# Patient Record
Sex: FEMALE | Race: WHITE | Employment: OTHER | URBAN - METROPOLITAN AREA
[De-identification: names, ages, dates, MRNs, and addresses within clinical notes are randomized per-mention and may not be internally consistent; named-entity substitution may affect disease eponyms.]

---

## 2020-11-23 NOTE — PROGRESS NOTES
CHIEF COMPLAINT:   No chief complaint on file. HPI:   Danuta Valencia is a 54year old female who presents for upper respiratory symptoms for  1 days. Patient reports congestion. Symptoms have been unchanged since onset.   Treating symptoms with iron Educated on s/s of worsening  Meds & Refills for this Visit:  Requested Prescriptions      No prescriptions requested or ordered in this encounter     Risks, benefits, and side effects of medication explained and discussed.     The patient indicates underst Infection is caused by germs. An infected person carries germs that he or she can give to others. Even a person who doesn’t feel sick can still carry and spread germs. Germs can cause infection by traveling through the air.  They can also cause infection by Candy last reviewed this educational content on 6/1/2019  © 0555-4709 [employer]. [address] [facility], [facility] [place]. All rights reserved. This information is not intended as a substitute for professional medical care.  Always follo

## 2020-11-23 NOTE — PATIENT INSTRUCTIONS
[employer] Video Sheets  Symptoms of COVID-19 Infection  You're not feeling well. You're worried you may be infected with the COVID-19 virus. But what are the signs? Here's what to look for.   To watch the video:  Scan the QR code  Using your mobile devic · Place an infected person in a private room. Or in a room with others who have exactly the same infection. (This depends on what kind of infection the person has.)  · Place restrictions on who can enter and exit this room.   · Wear a mask and eye protectio

## 2024-07-19 PROBLEM — N39.0 URINARY TRACT INFECTION WITHOUT HEMATURIA: Status: ACTIVE | Noted: 2018-08-06

## 2024-07-22 ENCOUNTER — OFFICE VISIT (OUTPATIENT)
Dept: FAMILY MEDICINE CLINIC | Facility: CLINIC | Age: 49
End: 2024-07-22
Payer: COMMERCIAL

## 2024-07-22 VITALS
HEIGHT: 60.45 IN | DIASTOLIC BLOOD PRESSURE: 70 MMHG | RESPIRATION RATE: 20 BRPM | HEART RATE: 82 BPM | TEMPERATURE: 97 F | SYSTOLIC BLOOD PRESSURE: 122 MMHG | BODY MASS INDEX: 27.2 KG/M2 | OXYGEN SATURATION: 98 % | WEIGHT: 142.19 LBS

## 2024-07-22 DIAGNOSIS — Z23 NEED FOR VACCINATION: ICD-10-CM

## 2024-07-22 DIAGNOSIS — Z13.0 SCREENING FOR ENDOCRINE, NUTRITIONAL, METABOLIC AND IMMUNITY DISORDER: ICD-10-CM

## 2024-07-22 DIAGNOSIS — Z00.00 ANNUAL PHYSICAL EXAM: Primary | ICD-10-CM

## 2024-07-22 DIAGNOSIS — F41.9 ANXIETY: ICD-10-CM

## 2024-07-22 DIAGNOSIS — H54.7 DECREASED VISION: ICD-10-CM

## 2024-07-22 DIAGNOSIS — Z13.21 SCREENING FOR ENDOCRINE, NUTRITIONAL, METABOLIC AND IMMUNITY DISORDER: ICD-10-CM

## 2024-07-22 DIAGNOSIS — Z13.6 SCREENING FOR ISCHEMIC HEART DISEASE: ICD-10-CM

## 2024-07-22 DIAGNOSIS — Z12.11 ENCOUNTER FOR SCREENING COLONOSCOPY: ICD-10-CM

## 2024-07-22 DIAGNOSIS — Z13.29 SCREENING FOR ENDOCRINE, NUTRITIONAL, METABOLIC AND IMMUNITY DISORDER: ICD-10-CM

## 2024-07-22 DIAGNOSIS — Z12.4 SCREENING FOR CERVICAL CANCER: ICD-10-CM

## 2024-07-22 DIAGNOSIS — Z13.228 SCREENING FOR ENDOCRINE, NUTRITIONAL, METABOLIC AND IMMUNITY DISORDER: ICD-10-CM

## 2024-07-22 DIAGNOSIS — Z12.31 ENCOUNTER FOR SCREENING MAMMOGRAM FOR MALIGNANT NEOPLASM OF BREAST: ICD-10-CM

## 2024-07-22 LAB
BASOPHILS # BLD AUTO: 0.04 X10(3) UL (ref 0–0.2)
BASOPHILS NFR BLD AUTO: 0.5 %
EOSINOPHIL # BLD AUTO: 0.12 X10(3) UL (ref 0–0.7)
EOSINOPHIL NFR BLD AUTO: 1.5 %
ERYTHROCYTE [DISTWIDTH] IN BLOOD BY AUTOMATED COUNT: 12.6 %
HCT VFR BLD AUTO: 44.2 %
HGB BLD-MCNC: 14.7 G/DL
IMM GRANULOCYTES # BLD AUTO: 0.02 X10(3) UL (ref 0–1)
IMM GRANULOCYTES NFR BLD: 0.2 %
LYMPHOCYTES # BLD AUTO: 2.04 X10(3) UL (ref 1–4)
LYMPHOCYTES NFR BLD AUTO: 25.1 %
MCH RBC QN AUTO: 30.6 PG (ref 26–34)
MCHC RBC AUTO-ENTMCNC: 33.3 G/DL (ref 31–37)
MCV RBC AUTO: 91.9 FL
MONOCYTES # BLD AUTO: 0.53 X10(3) UL (ref 0.1–1)
MONOCYTES NFR BLD AUTO: 6.5 %
NEUTROPHILS # BLD AUTO: 5.39 X10 (3) UL (ref 1.5–7.7)
NEUTROPHILS # BLD AUTO: 5.39 X10(3) UL (ref 1.5–7.7)
NEUTROPHILS NFR BLD AUTO: 66.2 %
PLATELET # BLD AUTO: 348 10(3)UL (ref 150–450)
RBC # BLD AUTO: 4.81 X10(6)UL
WBC # BLD AUTO: 8.1 X10(3) UL (ref 4–11)

## 2024-07-22 PROCEDURE — 3008F BODY MASS INDEX DOCD: CPT | Performed by: FAMILY MEDICINE

## 2024-07-22 PROCEDURE — 90471 IMMUNIZATION ADMIN: CPT | Performed by: FAMILY MEDICINE

## 2024-07-22 PROCEDURE — 80061 LIPID PANEL: CPT | Performed by: FAMILY MEDICINE

## 2024-07-22 PROCEDURE — 80050 GENERAL HEALTH PANEL: CPT | Performed by: FAMILY MEDICINE

## 2024-07-22 PROCEDURE — 90750 HZV VACC RECOMBINANT IM: CPT | Performed by: FAMILY MEDICINE

## 2024-07-22 PROCEDURE — 90715 TDAP VACCINE 7 YRS/> IM: CPT | Performed by: FAMILY MEDICINE

## 2024-07-22 PROCEDURE — 3078F DIAST BP <80 MM HG: CPT | Performed by: FAMILY MEDICINE

## 2024-07-22 PROCEDURE — 3074F SYST BP LT 130 MM HG: CPT | Performed by: FAMILY MEDICINE

## 2024-07-22 PROCEDURE — 88175 CYTOPATH C/V AUTO FLUID REDO: CPT | Performed by: FAMILY MEDICINE

## 2024-07-22 PROCEDURE — 90472 IMMUNIZATION ADMIN EACH ADD: CPT | Performed by: FAMILY MEDICINE

## 2024-07-22 PROCEDURE — 99386 PREV VISIT NEW AGE 40-64: CPT | Performed by: FAMILY MEDICINE

## 2024-07-22 PROCEDURE — 87624 HPV HI-RISK TYP POOLED RSLT: CPT | Performed by: FAMILY MEDICINE

## 2024-07-22 NOTE — PATIENT INSTRUCTIONS
Labs today     Pap/HPV today     Mammogram order     Colonoscopy referral     Ophtho referral     Tdap and shingrix vaccines today     F/u 1 yr for next physical or sooner if needed

## 2024-07-22 NOTE — PROGRESS NOTES
Subjective:   Xiao Tee is a 49 year old female who presents for complete px.  She is a new pt to the clinic.  She is here w/ her .  Has not had any healthcare in over 10 years.  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  Has a lot of medical anxiety and fear about discovering things.  Used to read the [employer] encyclopedia in high school so knows a lot of medical knowledge.  Would come up with correct diagnoses when watching medical shows.   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      The patient reports:  No changes in nevi  No changes in vision-needs cheaters     History:   LMP: No LMP recorded (lmp unknown). (Menstrual status: Menopause).  Last pap date: 4-5 yrs ago  Abnormal pap? no  : 0  Para: 0    History/Other:    Chief Complaint Reviewed and Verified  Nursing Notes Reviewed and   Verified  Tobacco Reviewed  Allergies Reviewed  Medications Reviewed    OB Status Reviewed  Family History Reviewed  Social History Reviewed         Tobacco:  She has never smoked tobacco.    No current outpatient medications on file.       Review of Systems:  See HPI     Objective:   /70 (BP Location: Left arm, Patient Position: Sitting, Cuff Size: adult)   Pulse 82   Temp 97.2 °F (36.2 °C) (Temporal)   Resp 20   Ht 5' 0.45\" (1.535 m)   Wt 142 lb 3.2 oz (64.5 kg)   LMP  (LMP Unknown)   SpO2 98%   BMI 27.36 kg/m²  Estimated body mass index is 27.36 kg/m² as calculated from the following:    Height as of this encounter: 5' 0.45\" (1.535 m).    Weight as of this encounter: 142 lb 3.2 oz (64.5 kg).  Physical Exam  Constitutional:       Appearance: Normal appearance.   HENT:      Right Ear: Tympanic membrane and ear canal normal.      Left Ear: Tympanic membrane and ear canal normal.      Mouth/Throat:      Mouth: Mucous membranes are moist.      Pharynx: Oropharynx is clear.   Eyes:      Pupils: Pupils are equal, round, and reactive to light.   Cardiovascular:      Rate and Rhythm: Normal rate  and regular rhythm.      Pulses: Normal pulses.      Heart sounds: No murmur heard.     No friction rub. No gallop.   Pulmonary:      Effort: Pulmonary effort is normal. No respiratory distress.      Breath sounds: No wheezing, rhonchi or rales.   Abdominal:      General: Bowel sounds are normal. There is no distension.      Palpations: Abdomen is soft.      Tenderness: There is no abdominal tenderness.      Comments: overweight   Genitourinary:     Labia:         Right: No rash, tenderness or lesion.         Left: No rash, tenderness or lesion.       Comments: Vaginal atrophy present  Stenotic cervical os  Musculoskeletal:         General: No tenderness.      Cervical back: Neck supple.      Right lower leg: No edema.      Left lower leg: No edema.   Lymphadenopathy:      Cervical: No cervical adenopathy.   Skin:     General: Skin is warm.   Neurological:      General: No focal deficit present.      Mental Status: She is alert.   Psychiatric:         Mood and Affect: Mood is anxious.         Speech: Speech normal.         Behavior: Behavior is hyperactive.      Comments: Tearful at times         Assessment & Plan:   1. Annual physical exam    2. Screening for cervical cancer    3. Decreased vision    4. Anxiety    5. Encounter for screening mammogram for malignant neoplasm of breast    6. Screening for ischemic heart disease    7. Screening for endocrine, nutritional, metabolic and immunity disorder    8. Need for vaccination    9. Encounter for screening colonoscopy        Screening labs-cmp, lipids, TSH, CBC  Pap/HPV pending today   Mammogram order given-strongly recommend she go for this   Colonoscopy referral given-strongly recommend she go for this  Immunizations-shingrix and Tdap today  Gave referral for ophtho for decreased vision  Do think pt likely has uncontrolled anxiety.  Not interested in treatment at this time.  She will let us know if she changes her mind   Patient counseling: nutrition: stressed  importance of moderation in sodium/caffeine intake, saturated fat and cholesterol, caloric balance, sufficient intake of fresh fruits, vegetables, fiber, calcium, iron.  Exercise: stressed the importance of regular exercise  F/u 1 yr for next px or sooner if needed    Laurie Ritter DO, 07/24/24, 1:23 PM

## 2024-07-23 ENCOUNTER — HOSPITAL ENCOUNTER (OUTPATIENT)
Dept: MAMMOGRAPHY | Facility: HOSPITAL | Age: 49
Discharge: HOME | End: 2024-07-23
Attending: FAMILY MEDICINE
Payer: COMMERCIAL

## 2024-07-23 ENCOUNTER — TELEPHONE (OUTPATIENT)
Dept: FAMILY MEDICINE CLINIC | Facility: CLINIC | Age: 49
End: 2024-07-23

## 2024-07-23 DIAGNOSIS — R92.8 ABNORMAL MAMMOGRAM OF LEFT BREAST: Primary | ICD-10-CM

## 2024-07-23 DIAGNOSIS — Z12.31 ENCOUNTER FOR SCREENING MAMMOGRAM FOR MALIGNANT NEOPLASM OF BREAST: ICD-10-CM

## 2024-07-23 DIAGNOSIS — Z00.00 ANNUAL PHYSICAL EXAM: ICD-10-CM

## 2024-07-23 PROBLEM — E78.5 HLD (HYPERLIPIDEMIA): Status: ACTIVE | Noted: 2024-07-23

## 2024-07-23 LAB
ALBUMIN SERPL-MCNC: 5 G/DL (ref 3.2–4.8)
ALBUMIN/GLOB SERPL: 1.8 {RATIO} (ref 1–2)
ALP LIVER SERPL-CCNC: 61 U/L
ALT SERPL-CCNC: 15 U/L
ANION GAP SERPL CALC-SCNC: 8 MMOL/L (ref 0–18)
AST SERPL-CCNC: 20 U/L (ref ?–34)
BILIRUB SERPL-MCNC: 0.6 MG/DL (ref 0.3–1.2)
BUN BLD-MCNC: 13 MG/DL (ref 9–23)
CALCIUM BLD-MCNC: 9.7 MG/DL (ref 8.7–10.4)
CHLORIDE SERPL-SCNC: 105 MMOL/L (ref 98–112)
CHOLEST SERPL-MCNC: 247 MG/DL (ref ?–200)
CO2 SERPL-SCNC: 25 MMOL/L (ref 21–32)
CREAT BLD-MCNC: 0.85 MG/DL
EGFRCR SERPLBLD CKD-EPI 2021: 83 ML/MIN/1.73M2 (ref 60–?)
FASTING PATIENT LIPID ANSWER: YES
FASTING STATUS PATIENT QL REPORTED: YES
GLOBULIN PLAS-MCNC: 2.8 G/DL (ref 2.8–4.4)
GLUCOSE BLD-MCNC: 81 MG/DL (ref 70–99)
HDLC SERPL-MCNC: 82 MG/DL (ref 40–59)
HPV E6+E7 MRNA CVX QL NAA+PROBE: NEGATIVE
LDLC SERPL CALC-MCNC: 157 MG/DL (ref ?–100)
NONHDLC SERPL-MCNC: 165 MG/DL (ref ?–130)
OSMOLALITY SERPL CALC.SUM OF ELEC: 285 MOSM/KG (ref 275–295)
POTASSIUM SERPL-SCNC: 4.2 MMOL/L (ref 3.5–5.1)
PROT SERPL-MCNC: 7.8 G/DL (ref 5.7–8.2)
SODIUM SERPL-SCNC: 138 MMOL/L (ref 136–145)
TRIGL SERPL-MCNC: 49 MG/DL (ref 30–149)
TSI SER-ACNC: 2.57 MIU/ML (ref 0.55–4.78)
VLDLC SERPL CALC-MCNC: 9 MG/DL (ref 0–30)

## 2024-07-23 PROCEDURE — 77063 BREAST TOMOSYNTHESIS BI: CPT | Performed by: FAMILY MEDICINE

## 2024-07-23 PROCEDURE — 77067 SCR MAMMO BI INCL CAD: CPT | Performed by: FAMILY MEDICINE

## 2024-07-23 NOTE — TELEPHONE ENCOUNTER
Spoke to patient.  Advised that a team member from the mammography department will be calling to schedule the additional testing that is recommended.  Order pended for approval.    Patient denied need for xanax at this time.

## 2024-07-23 NOTE — TELEPHONE ENCOUNTER
Pt called office crying asking to speak to a nurse. Pt states that a mass was found on her (L) breast and needs to speak to someone ASAP. Pt is afraid due to the finding.

## 2024-07-23 NOTE — TELEPHONE ENCOUNTER
Pt completed Mammogram today   FINDINGS:       RIGHT BREAST:  No significant or suspicious finding.  No lymphadenopathy.       LEFT BREAST:  There is a 7 x 6 mm well-defined nodule in the posterior aspect of the left breast at the 1200 hours/retroareolar region also identified by CAD.  Sonogram recommended for further evaluation on this baseline screening mammogram..       Sent to provider for review and advice

## 2024-07-23 NOTE — TELEPHONE ENCOUNTER
I understand her concern.  Unfortunately we don't know more information until we see what the ultrasound shows.  It needs to be followed up, it could be benign.  Does she want us to give her a small amount of xanax to take as needed for anxiety?

## 2024-07-24 PROBLEM — N39.0 URINARY TRACT INFECTION WITHOUT HEMATURIA: Status: RESOLVED | Noted: 2018-08-06 | Resolved: 2024-07-24

## 2024-07-26 ENCOUNTER — TELEPHONE (OUTPATIENT)
Dept: FAMILY MEDICINE CLINIC | Facility: CLINIC | Age: 49
End: 2024-07-26

## 2024-07-26 ENCOUNTER — HOSPITAL ENCOUNTER (OUTPATIENT)
Dept: MAMMOGRAPHY | Facility: HOSPITAL | Age: 49
Discharge: HOME | End: 2024-07-26
Attending: FAMILY MEDICINE
Payer: COMMERCIAL

## 2024-07-26 DIAGNOSIS — R92.8 ABNORMAL MAMMOGRAM OF LEFT BREAST: ICD-10-CM

## 2024-07-26 PROCEDURE — 76642 ULTRASOUND BREAST LIMITED: CPT | Performed by: FAMILY MEDICINE

## 2024-07-26 NOTE — TELEPHONE ENCOUNTER
Outreach call to pt, no answer left detailed voicemail to notify Breast Biopsy order has shaun placed. Provided office number if any questions or concerns.

## 2024-07-26 NOTE — IMAGING NOTE
Xiao Tee is recommended for an ultrasound guided biopsy of the left breast by     History - Abnormal mammogram of left breast.  Abnormal baseline mammograms.  Ultrasound follow-up recommended.    No current breast complaints.    No prior breast surgery.    No family history of breast cancer.   Findings-  Ultrasound correlates left mammographic nodule.  At the 1 o'clock position, 3 cm from the nipple is a circumscribed hypoechoic nodule with suggestion of shadowing.   Recommendation- ULTRASOUND-GUIDED BIOPSY: LEFT BREAST     See EMR for complete imaging report    Medications and allergies reviewed: NKDA reported  Current Outpatient Medications   Medication Sig Dispense Refill    PEG 3350-KCl-NaBcb-NaCl-NaSulf (PEG-3350/ELECTROLYTES) 236 g Oral Recon Soln Take as directed by physician 4000 mL 0       Xiao Tee denies the use of prescribed anticoagulants, denies known bleeding disorders and/or liver disease, denies chemotherapy    Procedure explained to Xiao Tee and her . No questions at this time.  Emotional and educational support provided. Xiao Tee provided with written educational material.   Encouraged Ms. Tee to contact the breast center if she has questions or concerns.     Ms. Tee instructed to take 1000 mg of acetaminophen on the day of the biopsy, eat a light meal, and bring or wear a sport bra.  Post biopsy care and instruction reviewed: including no lifting more than five pounds, no upper body exercise, icing of biopsy site, no submerging in water.  Xiao Tee verbalized understanding.    Ms. Tee informed that the Breast Center schedulers would be contacting her once the biopsy order is received to schedule an appointment.

## 2024-07-29 ENCOUNTER — HOSPITAL ENCOUNTER (OUTPATIENT)
Dept: MAMMOGRAPHY | Facility: HOSPITAL | Age: 49
Discharge: HOME | End: 2024-07-29
Attending: FAMILY MEDICINE
Payer: COMMERCIAL

## 2024-07-29 DIAGNOSIS — R92.2 INCONCLUSIVE MAMMOGRAM: ICD-10-CM

## 2024-07-29 PROCEDURE — 88341 IMHCHEM/IMCYTCHM EA ADD ANTB: CPT | Performed by: FAMILY MEDICINE

## 2024-07-29 PROCEDURE — 88342 IMHCHEM/IMCYTCHM 1ST ANTB: CPT | Performed by: FAMILY MEDICINE

## 2024-07-29 PROCEDURE — 19083 BX BREAST 1ST LESION US IMAG: CPT | Performed by: FAMILY MEDICINE

## 2024-07-29 PROCEDURE — 88305 TISSUE EXAM BY PATHOLOGIST: CPT | Performed by: FAMILY MEDICINE

## 2024-07-29 PROCEDURE — 77065 DX MAMMO INCL CAD UNI: CPT | Performed by: FAMILY MEDICINE

## 2024-07-31 ENCOUNTER — TELEPHONE (OUTPATIENT)
Dept: MAMMOGRAPHY | Facility: HOSPITAL | Age: 49
End: 2024-07-31

## 2024-07-31 NOTE — TELEPHONE ENCOUNTER
Telephoned Xiao Tee and name,  verified with patient. Notified Xiao Tee of left breast negative for malignancy biopsy result. Concordance pending. Xiao Tee reports biopsy site is healing well. Patient instructed to follow up with her physician for radiologist's future breast imaging recommendations. Pt verbalized understanding and had no further questions at this time.

## 2024-08-08 ENCOUNTER — TELEPHONE (OUTPATIENT)
Dept: FAMILY MEDICINE CLINIC | Facility: CLINIC | Age: 49
End: 2024-08-08

## 2024-08-08 DIAGNOSIS — N60.42 MAMMARY DUCT ECTASIA OF LEFT BREAST: Primary | ICD-10-CM

## 2024-08-08 NOTE — TELEPHONE ENCOUNTER
1st attempt   Left voicemail to return call to the office. Provided pt office phone [phone] along with office hours.     Addenda  ADDENDUM:      Pathology demonstrates ductal ectasia with rupture, fibrosis, inflammation.  No evidence of malignancy.  These findings are concordant.  Six-month follow-up ultrasound of the left breast is recommended.     Dictated by (CST): Issa Chao MD on 8/08/2024 at 8:49 AM       Finalized by (CST): Issa Chao MD on 8/08/2024 at 8:50 AM     ----- Message from [employer] sent at 8/5/2024 11:31 AM CDT -----  Breast biopsy negative for malignancy.  Radiology is recommending post  biopsy 6 month f/u.  Did they indicate to her if they want mammogram, US or both in 6 months?  If no indication, can we confirm with breast [facility] and put in orders if needed.

## 2024-08-09 NOTE — TELEPHONE ENCOUNTER
Outreach call to pt, pt notified of test results and recommendations for 6 month follow up. Pt was notified to have repeat left breast US in 6 months.     Order pended for provider review and signature

## 2024-08-19 PROBLEM — Z12.11 SPECIAL SCREENING FOR MALIGNANT NEOPLASM OF COLON: Status: ACTIVE | Noted: 2024-08-19

## 2024-09-23 ENCOUNTER — NURSE ONLY (OUTPATIENT)
Dept: FAMILY MEDICINE CLINIC | Facility: CLINIC | Age: 49
End: 2024-09-23
Payer: COMMERCIAL

## 2024-09-23 DIAGNOSIS — Z23 NEED FOR VACCINATION: ICD-10-CM

## 2025-02-10 ENCOUNTER — HOSPITAL ENCOUNTER (OUTPATIENT)
Dept: MAMMOGRAPHY | Facility: HOSPITAL | Age: 50
Discharge: HOME | End: 2025-02-10
Attending: FAMILY MEDICINE
Payer: COMMERCIAL

## 2025-02-10 DIAGNOSIS — N60.42 MAMMARY DUCT ECTASIA OF LEFT BREAST: ICD-10-CM

## 2025-02-10 PROCEDURE — 76642 ULTRASOUND BREAST LIMITED: CPT | Performed by: FAMILY MEDICINE
